# Patient Record
(demographics unavailable — no encounter records)

---

## 2025-03-02 NOTE — IMAGING
[de-identified] : LEFT KNEE Inspection: mild effusion Palpation: medial joint line tenderness, anterior tenderness Knee Range of Motion: 3-110 Strength: 5/5 Quadriceps strength, 5/5 Hamstring strength Neurological: light touch is intact throughout Ligament Stability and Special Tests:  McMurrays: neg Lachman: neg Pivot Shift: neg Posterior Drawer: neg Valgus: neg Varus: neg Patella Apprehension: neg Patella Maltracking: neg  RIGHT KNEE Inspection: mild effusion Palpation: medial joint line tenderness, anterior tenderness Knee Range of Motion: 0-125  Strength: 5/5 Quadriceps strength, 5/5 Hamstring strength Neurological: light touch is intact throughout Ligament Stability and Special Tests:  McMurrays: neg Lachman: neg Pivot Shift: neg Posterior Drawer: neg Valgus: neg Varus: neg Patella Apprehension: neg Patella Maltracking: neg

## 2025-03-02 NOTE — DISCUSSION/SUMMARY
[de-identified] : Patient Identification Name/: Verbal with patient and/or family   Procedure Verification: Procedure confirmed with patient or family/designee Consent for procedure: Verbal Consent Given Relevant documentation completed, reviewed, and signed Clinical indications for procedure confirmed   Time-out with all members of procedure team immediately prior to procedure: Correct patient identified. Agreement on procedure. Correct side and site.   US GUIDANCE KNEE INJECTION (STEROID) - *** BILATERAL *** After verbal consent and identification of the correct patient and correct site, the superolateral ***BILATERAL *** knee was prepped using alcohol. This was allowed time to air dry. A mixture of 1cc Celestone 6mg/ml, 2cc Lidocaine 1%, and 2cc Bupivacaine 0.5% was injected with US guidance into the suprapatellar pouch using a sterile 22G needle after ethyl chloride spray for skin anesthesia. The patient tolerated the procedure well. After-care instructions were provided and included instructions to ice the area and to call if redness, pain, or fever develop. Visualization of the needle and placement of the injection was performed without any complications. Ultrasound was used for visualization, precise injection in area of tear, and / or prior failure or difficult injection

## 2025-03-02 NOTE — HISTORY OF PRESENT ILLNESS
[de-identified] : 02/28/2025: The patient is a 62 year old F, Right hand dominant who presents today complaining of BOTH KNEE with the left being worse.  Left knee worse than right.   Date of Injury/Onset:  4 years Pain:    At Rest: 3/10 With Activity:  7/10 Mechanism of injury: Patient has trouble bearing weight and moving knee without pain Quality of symptoms: sharp Improves with: Tylenol, rest, PT, heat Worse with: bearing weight and moving knee Prior treatment: Patient was diagnosed with osteoarthrosis and given pain injection in [place], PCP

## 2025-03-02 NOTE — DISCUSSION/SUMMARY
[de-identified] : Patient Identification Name/: Verbal with patient and/or family   Procedure Verification: Procedure confirmed with patient or family/designee Consent for procedure: Verbal Consent Given Relevant documentation completed, reviewed, and signed Clinical indications for procedure confirmed   Time-out with all members of procedure team immediately prior to procedure: Correct patient identified. Agreement on procedure. Correct side and site.   US GUIDANCE KNEE INJECTION (STEROID) - *** BILATERAL *** After verbal consent and identification of the correct patient and correct site, the superolateral ***BILATERAL *** knee was prepped using alcohol. This was allowed time to air dry. A mixture of 1cc Celestone 6mg/ml, 2cc Lidocaine 1%, and 2cc Bupivacaine 0.5% was injected with US guidance into the suprapatellar pouch using a sterile 22G needle after ethyl chloride spray for skin anesthesia. The patient tolerated the procedure well. After-care instructions were provided and included instructions to ice the area and to call if redness, pain, or fever develop. Visualization of the needle and placement of the injection was performed without any complications. Ultrasound was used for visualization, precise injection in area of tear, and / or prior failure or difficult injection

## 2025-03-02 NOTE — IMAGING
[de-identified] : LEFT KNEE Inspection: mild effusion Palpation: medial joint line tenderness, anterior tenderness Knee Range of Motion: 3-110 Strength: 5/5 Quadriceps strength, 5/5 Hamstring strength Neurological: light touch is intact throughout Ligament Stability and Special Tests:  McMurrays: neg Lachman: neg Pivot Shift: neg Posterior Drawer: neg Valgus: neg Varus: neg Patella Apprehension: neg Patella Maltracking: neg  RIGHT KNEE Inspection: mild effusion Palpation: medial joint line tenderness, anterior tenderness Knee Range of Motion: 0-125  Strength: 5/5 Quadriceps strength, 5/5 Hamstring strength Neurological: light touch is intact throughout Ligament Stability and Special Tests:  McMurrays: neg Lachman: neg Pivot Shift: neg Posterior Drawer: neg Valgus: neg Varus: neg Patella Apprehension: neg Patella Maltracking: neg

## 2025-03-02 NOTE — ASSESSMENT
[FreeTextEntry1] : Bilateral  X-Ray Examination of the KNEE (4 views): medial and patellofemoral degenerate changes.  Assessment:   The patient is a 62 year old female with physical exam findings consistent with *** BILATERAL *** KNEE OSTEOARTHRITIS, advanced medial compartment.     - We discussed their diagnosis and treatment options at length including the risks and benefits of both surgical and nonsurgical options. - We will continue conservative treatment with activity modification, PT, icing, weight loss, and anti-inflammatory medications. - The patient was provided with a PT prescription to work on ROM, hip ER/abductors strengthening, quad/hamstring stretches and strengthening, and other exercises. - The patient was advised to let pain guide the gradual advancement of activities. - We discussed the possible of injections in the future (visco versus csi). -  The risks, benefits, and alternatives to corticosteroid injections were reviewed with the patient. Risks outlined include but are not limited to infection, sepsis, bleeding, scarring, skin discoloration, temporary increase in pain, syncopal episode, failure to resolve symptoms, symptoms recurrence, allergic reaction, flare reaction, and elevation of blood sugar in diabetics. Patient understood the risks and asked to proceed with this treatment course.  Bilateral knee CSI tolerated well.  - Since also having lower back and bilateral shoulder pain, will RX MDP.   No nsaids while on pack.   Follow up in 3 months - will consider Visco-3 versus Joint consult for TKA.

## 2025-03-02 NOTE — HISTORY OF PRESENT ILLNESS
[de-identified] : 02/28/2025: The patient is a 62 year old F, Right hand dominant who presents today complaining of BOTH KNEE with the left being worse.  Left knee worse than right.   Date of Injury/Onset:  4 years Pain:    At Rest: 3/10 With Activity:  7/10 Mechanism of injury: Patient has trouble bearing weight and moving knee without pain Quality of symptoms: sharp Improves with: Tylenol, rest, PT, heat Worse with: bearing weight and moving knee Prior treatment: Patient was diagnosed with osteoarthrosis and given pain injection in [place], PCP

## 2025-06-28 NOTE — IMAGING
[de-identified] : LEFT KNEE Inspection: mild effusion Palpation: medial joint line tenderness, anterior tenderness Knee Range of Motion: 3-110 Strength: 5/5 Quadriceps strength, 5/5 Hamstring strength Neurological: light touch is intact throughout Ligament Stability and Special Tests:  McMurrays: neg Lachman: neg Pivot Shift: neg Posterior Drawer: neg Valgus: neg Varus: neg Patella Apprehension: neg Patella Maltracking: neg  RIGHT KNEE Inspection: mild effusion Palpation: medial joint line tenderness, anterior tenderness Knee Range of Motion: 0-125  Strength: 5/5 Quadriceps strength, 5/5 Hamstring strength Neurological: light touch is intact throughout Ligament Stability and Special Tests:  McMurrays: neg Lachman: neg Pivot Shift: neg Posterior Drawer: neg Valgus: neg Varus: neg Patella Apprehension: neg Patella Maltracking: neg

## 2025-06-28 NOTE — DISCUSSION/SUMMARY
[de-identified] : Assessment:   The patient is a 62 year old female with physical exam findings consistent with BILATERAL KNEE OSTEOARTHRITIS     - We discussed their diagnosis and treatment options at length including the risks and benefits of both surgical and nonsurgical options. - Patient would like to move forward w/ surgical intervention at this time. - I advised patient that I do not perform the TKA procedure and will refer patient to one of my colleagues.  - Meanwhile patient will continue icing, NSAIDs, and let pain guide the gradual advancement of activities.    Follow up with Dr. Roque for TKA consult

## 2025-06-28 NOTE — IMAGING
[de-identified] : LEFT KNEE Inspection: mild effusion Palpation: medial joint line tenderness, anterior tenderness Knee Range of Motion: 3-110 Strength: 5/5 Quadriceps strength, 5/5 Hamstring strength Neurological: light touch is intact throughout Ligament Stability and Special Tests:  McMurrays: neg Lachman: neg Pivot Shift: neg Posterior Drawer: neg Valgus: neg Varus: neg Patella Apprehension: neg Patella Maltracking: neg  RIGHT KNEE Inspection: mild effusion Palpation: medial joint line tenderness, anterior tenderness Knee Range of Motion: 0-125  Strength: 5/5 Quadriceps strength, 5/5 Hamstring strength Neurological: light touch is intact throughout Ligament Stability and Special Tests:  McMurrays: neg Lachman: neg Pivot Shift: neg Posterior Drawer: neg Valgus: neg Varus: neg Patella Apprehension: neg Patella Maltracking: neg

## 2025-06-28 NOTE — DISCUSSION/SUMMARY
[de-identified] : Assessment:   The patient is a 62 year old female with physical exam findings consistent with BILATERAL KNEE OSTEOARTHRITIS     - We discussed their diagnosis and treatment options at length including the risks and benefits of both surgical and nonsurgical options. - Patient would like to move forward w/ surgical intervention at this time. - I advised patient that I do not perform the TKA procedure and will refer patient to one of my colleagues.  - Meanwhile patient will continue icing, NSAIDs, and let pain guide the gradual advancement of activities.    Follow up with Dr. Roque for TKA consult

## 2025-06-28 NOTE — HISTORY OF PRESENT ILLNESS
[de-identified] : 06/27/2025: REJI is here today for follow-up on BILATERAL knee. Pain and symptoms are worse, CSI wore off after 10 days. Since last visit, pt states pain is constant. ambulating with cane.   02/28/2025: The patient is a 62 year old F, Right hand dominant who presents today complaining of BOTH KNEE with the left being worse.  Left knee worse than right.   Date of Injury/Onset:  4 years Pain:    At Rest: 3/10 With Activity:  7/10 Mechanism of injury: Patient has trouble bearing weight and moving knee without pain Quality of symptoms: sharp Improves with: Tylenol, rest, PT, heat Worse with: bearing weight and moving knee Prior treatment: Patient was diagnosed with osteoarthrosis and given pain injection in [place], PCP

## 2025-06-28 NOTE — HISTORY OF PRESENT ILLNESS
[de-identified] : 06/27/2025: REJI is here today for follow-up on BILATERAL knee. Pain and symptoms are worse, CSI wore off after 10 days. Since last visit, pt states pain is constant. ambulating with cane.   02/28/2025: The patient is a 62 year old F, Right hand dominant who presents today complaining of BOTH KNEE with the left being worse.  Left knee worse than right.   Date of Injury/Onset:  4 years Pain:    At Rest: 3/10 With Activity:  7/10 Mechanism of injury: Patient has trouble bearing weight and moving knee without pain Quality of symptoms: sharp Improves with: Tylenol, rest, PT, heat Worse with: bearing weight and moving knee Prior treatment: Patient was diagnosed with osteoarthrosis and given pain injection in [place], PCP